# Patient Record
Sex: FEMALE
[De-identification: names, ages, dates, MRNs, and addresses within clinical notes are randomized per-mention and may not be internally consistent; named-entity substitution may affect disease eponyms.]

---

## 2017-04-03 ENCOUNTER — HOSPITAL ENCOUNTER (EMERGENCY)
Dept: HOSPITAL 5 - ED | Age: 27
Discharge: HOME | End: 2017-04-03
Payer: SELF-PAY

## 2017-04-03 VITALS — SYSTOLIC BLOOD PRESSURE: 114 MMHG | DIASTOLIC BLOOD PRESSURE: 61 MMHG

## 2017-04-03 DIAGNOSIS — N30.01: ICD-10-CM

## 2017-04-03 DIAGNOSIS — O23.11: Primary | ICD-10-CM

## 2017-04-03 DIAGNOSIS — Z3A.08: ICD-10-CM

## 2017-04-03 DIAGNOSIS — O99.331: ICD-10-CM

## 2017-04-03 LAB
BASOPHILS NFR BLD AUTO: 0.3 % (ref 0–1.8)
BILIRUB UR QL STRIP: (no result)
BLOOD UR QL VISUAL: (no result)
EOSINOPHIL NFR BLD AUTO: 1.3 % (ref 0–4.3)
HCT VFR BLD CALC: 32.8 % (ref 30.3–42.9)
HGB BLD-MCNC: 10.2 GM/DL (ref 10.1–14.3)
KETONES UR STRIP-MCNC: (no result) MG/DL
LEUKOCYTE ESTERASE UR QL STRIP: (no result)
MCH RBC QN AUTO: 23 PG (ref 28–32)
MCHC RBC AUTO-ENTMCNC: 31 % (ref 30–34)
MCV RBC AUTO: 73 FL (ref 79–97)
MUCOUS THREADS #/AREA URNS HPF: (no result) /HPF
NITRITE UR QL STRIP: (no result)
PH UR STRIP: 5 [PH] (ref 5–7)
PLATELET # BLD: 256 K/MM3 (ref 140–440)
RBC # BLD AUTO: 4.51 M/MM3 (ref 3.65–5.03)
RBC #/AREA URNS HPF: 4 /HPF (ref 0–6)
UROBILINOGEN UR-MCNC: < 2 MG/DL (ref ?–2)
WBC # BLD AUTO: 10.7 K/MM3 (ref 4.5–11)
WBC #/AREA URNS HPF: 118 /HPF (ref 0–6)

## 2017-04-03 PROCEDURE — 36415 COLL VENOUS BLD VENIPUNCTURE: CPT

## 2017-04-03 PROCEDURE — 86901 BLOOD TYPING SEROLOGIC RH(D): CPT

## 2017-04-03 PROCEDURE — 86900 BLOOD TYPING SEROLOGIC ABO: CPT

## 2017-04-03 PROCEDURE — 86850 RBC ANTIBODY SCREEN: CPT

## 2017-04-03 PROCEDURE — 76817 TRANSVAGINAL US OBSTETRIC: CPT

## 2017-04-03 PROCEDURE — 81001 URINALYSIS AUTO W/SCOPE: CPT

## 2017-04-03 PROCEDURE — 85025 COMPLETE CBC W/AUTO DIFF WBC: CPT

## 2017-04-03 PROCEDURE — 84702 CHORIONIC GONADOTROPIN TEST: CPT

## 2017-04-03 PROCEDURE — 76801 OB US < 14 WKS SINGLE FETUS: CPT

## 2017-04-03 NOTE — ULTRASOUND REPORT
Pelvic and transvaginal sonography:



History: Vaginal bleeding/abdominal pain.



Findings:



Uterus measures 12.7 x 5.3 x 7 cm. Single intrauterine gestation is 

noted. CRL of the fetus is 9.6 mm corresponding to 7 weeks of 

gestation. Fetal heart rate 133 per minute.



Right ovary 4.4 x 3.9 x 2.9 cm. No mass.



Left ovary 3.3 x 2.7 x 2.2 cm. Complex cyst in the left ovary measures 

2.3 cm.



Impression:



Single viable intrauterine gestation.

## 2017-04-03 NOTE — EMERGENCY DEPARTMENT REPORT
ED Female  HPI





- General


Chief complaint: Vaginal Bleeding


Stated complaint: VAGINAL BLEEDING/8WKS


Time Seen by Provider: 17 17:35


Source: patient


Mode of arrival: Ambulatory


Limitations: No Limitations





- History of Present Illness


Initial comments: 





PT states she is 8 weeks pregnant and she is concerned she is having a 

miscarriage.  PT is G4, P2.  PT denies other complicates with previous 

pregnancies.  PT states she started having some lower abd pressure last night.  

PT states last night she started cramping and spotting.  PT denies vaginal 

discharge.  PT states she only sees the blood after she wipes. PT states she is 

terminating this pregnancy and has an appointment next week. 


MD Complaint: vaginal bleeding


-: Gradual, Last night


Location: suprapubic


Severity scale (0 -10): 4


Quality: cramping, burning


Consistency: constant


Worsens with: urination


Are you Pregnant Now?: Yes


Associated Symptoms: vaginal bleeding, abdominal pain, dysuria, hematuria.  

denies: vaginal discharge, nausea/vomiting, fever/chills, loss of appetite





- Related Data


Sexually active: Yes


: 4


Para: 2


A: 1


 Home Medications











 Medication  Instructions  Recorded  Confirmed  Last Taken


 


Methadone HCl 120 mg PO QDAY 17 07:00








 Previous Rx's











 Medication  Instructions  Recorded  Last Taken  Type


 


Nitrofurantoin Mono/M-Cryst 100 mg PO Q12HR #14 capsule 17 Unknown Rx





[Macrobid CAP]    











 Allergies











Allergy/AdvReac Type Severity Reaction Status Date / Time


 


No Known Allergies Allergy   Verified 17 12:41














ED Review of Systems


ROS: 


Stated complaint: VAGINAL BLEEDING/8WKS


Other details as noted in HPI





Comment: All other systems reviewed and negative


Constitutional: denies: chills, fever


Gastrointestinal: abdominal pain.  denies: nausea, vomiting


Genitourinary: dysuria, hematuria, abnormal menses.  denies: discharge


Musculoskeletal: back pain (chronic - pt states she had normal MRI )





ED Past Medical Hx





- Past Medical History


Previous Medical History?: No





- Surgical History


Additional Surgical History: c-sectionx2.  





- Social History


Smoking Status: Current Every Day Smoker


Substance Use Type: Prescribed





- Medications


Home Medications: 


 Home Medications











 Medication  Instructions  Recorded  Confirmed  Last Taken  Type


 


Methadone HCl 120 mg PO QDAY 17 07:00 History


 


Nitrofurantoin Mono/M-Cryst 100 mg PO Q12HR #14 capsule 17  Unknown Rx





[Macrobid CAP]     














ED Physical Exam





- General


Limitations: No Limitations


General appearance: alert, in no apparent distress





- Head


Head exam: Present: atraumatic, normocephalic





- Eye


Eye exam: Present: normal appearance.  Absent: conjunctival injection





- ENT


ENT exam: Present: normal exam, normal external ear exam





- Neck


Neck exam: Present: normal inspection, full ROM





- Respiratory


Respiratory exam: Present: normal lung sounds bilaterally.  Absent: respiratory 

distress, wheezes, rales, rhonchi, stridor





- Cardiovascular


Cardiovascular Exam: Present: regular rate, normal rhythm, normal heart sounds





- GI/Abdominal


GI/Abdominal exam: Present: soft, normal bowel sounds.  Absent: distended, 

tenderness





- Extremities Exam


Extremities exam: Present: normal inspection, full ROM





- Back Exam


Back exam: Present: normal inspection, full ROM, other (tattoos).  Absent: 

tenderness, CVA tenderness (R), CVA tenderness (L), muscle spasm





- Neurological Exam


Neurological exam: Present: alert, oriented X3, normal gait





- Psychiatric


Psychiatric exam: Present: normal affect, normal mood





- Skin


Skin exam: Present: warm, dry, intact





ED Course


 Vital Signs











  17





  12:40


 


Temperature 98.2 F


 


Pulse Rate 76


 


Respiratory 16





Rate 


 


Blood Pressure 112/66


 


O2 Sat by Pulse 100





Oximetry 














- Reevaluation(s)


Reevaluation #1: 





17 17:52


PT aware of plan of care. 


Reevaluation #2: 





17 18:04


At this time pt is refusing pelvic exam and further testing. PT not concerned 

for STD.  PT aware without pelvic exam/ further labs she could have an 

undiagnosed medical condition.  PT aware she will need to follow up with OB/Gyn 








- Pulse Oximetry Interpretation


  ** Digit-Finger


Initial Pulse Oximetry Readin


Actions Taken: none





ED Medical Decision Making





- Lab Data


Result diagrams: 


 17 13:07








- Radiology Data


Radiology results: report reviewed





pelvic US- single live IUP seven weeks, hr 133





- Differential Diagnosis


uti, std, threatened ab 


Critical Care Time: No


Critical care attestation.: 


If time is entered above; I have spent that time in minutes in the direct care 

of this critically ill patient, excluding procedure time.








ED Disposition


Clinical Impression: 


 Abdominal pain affecting pregnancy





UTI (urinary tract infection)


Qualifiers:


 Urinary tract infection type: acute cystitis Hematuria presence: with 

hematuria Qualified Code(s): N30.01 - Acute cystitis with hematuria





Disposition: DISCHARGED TO HOME OR SELFCARE


Is pt being admited?: No


Does the pt Need Aspirin: No


Condition: Stable


Instructions:  Threatened Miscarriage (ED), Urinary Tract Infection in Women (ED

)


Additional Instructions: 


Pelvic rest x 7 days


OTC Tylenol as needed for cramping/ burning 


Finish all of your antibiotics, return to ED if fevers, chills, nausea or 

vomiting


Follow up with OB/GYN this week 


Prescriptions: 


Nitrofurantoin Mono/M-Cryst [Macrobid CAP] 100 mg PO Q12HR #14 capsule


Referrals: 


PRIMARY CARE,MD [Primary Care Provider] - 3-5 Days


YAMILETH HELM MD [Staff Physician] - 3-5 Days


Forms:  Work/School Release Form(ED)


Time of Disposition: 18:05

## 2018-01-07 ENCOUNTER — HOSPITAL ENCOUNTER (EMERGENCY)
Dept: HOSPITAL 5 - ED | Age: 28
Discharge: HOME | End: 2018-01-07
Payer: MEDICAID

## 2018-01-07 VITALS — SYSTOLIC BLOOD PRESSURE: 109 MMHG | DIASTOLIC BLOOD PRESSURE: 72 MMHG

## 2018-01-07 DIAGNOSIS — Z3A.00: ICD-10-CM

## 2018-01-07 DIAGNOSIS — O99.333: ICD-10-CM

## 2018-01-07 DIAGNOSIS — O23.43: Primary | ICD-10-CM

## 2018-01-07 LAB
BACTERIA #/AREA URNS HPF: (no result) /HPF
BENZODIAZEPINES SCREEN,URINE: (no result)
BILIRUB UR QL STRIP: (no result)
BLOOD UR QL VISUAL: (no result)
METHADONE SCREEN,URINE: (no result)
NITRITE UR QL STRIP: (no result)
OPIATE SCREEN,URINE: (no result)
PH UR STRIP: 8 [PH] (ref 5–7)
PROT UR STRIP-MCNC: (no result) MG/DL
RBC #/AREA URNS HPF: 4 /HPF (ref 0–6)
UROBILINOGEN UR-MCNC: < 2 MG/DL (ref ?–2)
WBC #/AREA URNS HPF: 2 /HPF (ref 0–6)

## 2018-01-07 PROCEDURE — 81025 URINE PREGNANCY TEST: CPT

## 2018-01-07 PROCEDURE — 81001 URINALYSIS AUTO W/SCOPE: CPT

## 2018-01-07 PROCEDURE — 80320 DRUG SCREEN QUANTALCOHOLS: CPT

## 2018-01-07 PROCEDURE — G0480 DRUG TEST DEF 1-7 CLASSES: HCPCS

## 2018-01-07 PROCEDURE — 87086 URINE CULTURE/COLONY COUNT: CPT

## 2018-01-07 PROCEDURE — 80307 DRUG TEST PRSMV CHEM ANLYZR: CPT

## 2018-01-07 PROCEDURE — 99283 EMERGENCY DEPT VISIT LOW MDM: CPT

## 2018-01-07 PROCEDURE — 36415 COLL VENOUS BLD VENIPUNCTURE: CPT

## 2018-01-07 NOTE — EMERGENCY DEPARTMENT REPORT
ED General Adult HPI





- General


Chief complaint: Pain General


Stated complaint: WITHDRAWAL FROM METH


Time Seen by Provider: 18 16:13


Source: patient


Mode of arrival: Ambulatory


Limitations: No Limitations





- History of Present Illness


Initial comments: 





Patient is a 27-year-old  female with a past medical history of 

chronic pain on methadone who is presenting with methadone withdrawal.  Patient 

states she last had her methadone yesterday was run out and cannot go to clinic 

until tomorrow.  Patient states she has chills with vomiting.  She states her 

only pain is suprapubic with some radiation to the lower back.  Patient denies 

any dysuria vaginal discharge or vaginal bleeding at this time.  Patient has 

has some mild nausea this been going on her entire pregnancy but is worse that 

she ran out of her methadone


Radiation: back


Severity scale (0 -10): 5


Quality: burning





- Related Data


 Home Medications











 Medication  Instructions  Recorded  Confirmed  Last Taken


 


Methadone HCl 120 mg PO QDAY 17 07:00








 Previous Rx's











 Medication  Instructions  Recorded  Last Taken  Type


 


Nitrofurantoin Mono/M-Cryst 100 mg PO Q12HR #14 capsule 17 Unknown Rx





[Macrobid CAP]    


 


Nitrofurantoin Monohyd/M-Cryst 100 mg PO BID #14 capsule 18 Unknown Rx





[Macrobid 100 mg Capsule]    


 


Ondansetron [Zofran Odt] 4 mg PO Q8HR #10 tab.rapdis 18 Unknown Rx











 Allergies











Allergy/AdvReac Type Severity Reaction Status Date / Time


 


No Known Allergies Allergy   Verified 17 12:41














ED Review of Systems


ROS: 


Stated complaint: WITHDRAWAL FROM METH


Other details as noted in HPI





Comment: All other systems reviewed and negative





ED Past Medical Hx





- Past Medical History


Previous Medical History?: No





- Surgical History


Past Surgical History?: Yes


Additional Surgical History: c-sectionx2.  





- Social History


Smoking Status: Current Every Day Smoker


Substance Use Type: None





- Medications


Home Medications: 


 Home Medications











 Medication  Instructions  Recorded  Confirmed  Last Taken  Type


 


Methadone HCl 120 mg PO QDAY 17 07:00 History


 


Nitrofurantoin Mono/M-Cryst 100 mg PO Q12HR #14 capsule 17  Unknown Rx





[Macrobid CAP]     


 


Nitrofurantoin Monohyd/M-Cryst 100 mg PO BID #14 capsule 18  Unknown Rx





[Macrobid 100 mg Capsule]     


 


Ondansetron [Zofran Odt] 4 mg PO Q8HR #10 tab.rapdis 18  Unknown Rx














ED Physical Exam





- General


Limitations: No Limitations


General appearance: alert, in no apparent distress





- Head


Head exam: Present: atraumatic, normocephalic





- Eye


Eye exam: Present: normal appearance





- ENT


ENT exam: Present: mucous membranes moist





- Neck


Neck exam: Present: normal inspection





- Respiratory


Respiratory exam: Present: normal lung sounds bilaterally.  Absent: respiratory 

distress





- Cardiovascular


Cardiovascular Exam: Present: regular rate, normal rhythm.  Absent: systolic 

murmur, diastolic murmur, rubs, gallop





- GI/Abdominal


GI/Abdominal exam: Present: soft, tenderness (suprapubic), normal bowel sounds





- Extremities Exam


Extremities exam: Present: normal inspection





- Back Exam


Back exam: Present: normal inspection





- Neurological Exam


Neurological exam: Present: alert, oriented X3





- Psychiatric


Psychiatric exam: Present: normal affect, normal mood





- Skin


Skin exam: Present: warm, dry, intact, normal color.  Absent: rash





ED Course





 Vital Signs











  18





  11:54 16:20 16:25


 


Temperature 98 F  98.1 F


 


Pulse Rate 103 H  80


 


Respiratory 16 13 12





Rate   


 


Blood Pressure 103/73  


 


Blood Pressure   109/72





[Left]   


 


O2 Sat by Pulse 98 100 





Oximetry   














ED Medical Decision Making





- Lab Data








 Lab Results











  18 Range/Units





  12:16 Unknown Unknown 


 


Urine Color   Yellow   (Yellow)  


 


Urine Turbidity   Clear   (Clear)  


 


Urine pH   8.0 H   (5.0-7.0)  


 


Ur Specific Gravity   1.009   (1.003-1.030)  


 


Urine Protein   <15 mg/dl   (Negative)  mg/dL


 


Urine Glucose (UA)   Neg   (Negative)  mg/dL


 


Urine Ketones   Neg   (Negative)  mg/dL


 


Urine Blood   Sm   (Negative)  


 


Urine Nitrite   Neg   (Negative)  


 


Urine Bilirubin   Neg   (Negative)  


 


Urine Urobilinogen   < 2.0   (<2.0)  mg/dL


 


Ur Leukocyte Esterase   Lg   (Negative)  


 


Urine WBC (Auto)   2.0   (0.0-6.0)  /HPF


 


Urine RBC (Auto)   4.0   (0.0-6.0)  /HPF


 


U Epithel Cells (Auto)   9.0   (0-13.0)  /HPF


 


Urine Bacteria (Auto)   1+   (Negative)  /HPF


 


Urine HCG, Qual   Positive A   (Negative)  


 


Urine Opiates Screen    Presumptive negative  


 


Urine Methadone Screen    Presumptive positive  


 


Ur Barbiturates Screen    Presumptive negative  


 


Ur Phencyclidine Scrn    Presumptive negative  


 


Ur Amphetamines Screen    Presumptive negative  


 


U Benzodiazepines Scrn    Presumptive positive  


 


Urine Cocaine Screen    Presumptive negative  


 


U Marijuana (THC) Screen    Presumptive negative  


 


Drugs of Abuse Note    Disclamer  


 


Plasma/Serum Alcohol  < 0.01    (0-0.07)  gm%














- Medical Decision Making





Patient given IV fluids and Zofran for nausea patient be discharged can follow 

with her methadone clinic tomorrow patient started on Macrobid for UTI


Critical care attestation.: 


If time is entered above; I have spent that time in minutes in the direct care 

of this critically ill patient, excluding procedure time.








ED Disposition


Clinical Impression: 


 Methadone dependence





UTI in pregnancy


Qualifiers:


 Trimester: third trimester Qualified Code(s): O23.43 - Unspecified infection 

of urinary tract in pregnancy, third trimester





Disposition: DC- TO HOME OR SELFCARE


Is pt being admited?: No


Does the pt Need Aspirin: No


Condition: Fair


Prescriptions: 


Nitrofurantoin Monohyd/M-Cryst [Macrobid 100 mg Capsule] 100 mg PO BID #14 

capsule


Ondansetron [Zofran Odt] 4 mg PO Q8HR #10 tab.minodis


Referrals: 


PRIMARY CARE,MD [Primary Care Provider] - 3-5 Days


Forms:  AMA Form

## 2019-10-21 ENCOUNTER — HOSPITAL ENCOUNTER (EMERGENCY)
Dept: HOSPITAL 5 - ED | Age: 29
Discharge: HOME | End: 2019-10-21
Payer: COMMERCIAL

## 2019-10-21 VITALS — SYSTOLIC BLOOD PRESSURE: 97 MMHG | DIASTOLIC BLOOD PRESSURE: 55 MMHG

## 2019-10-21 DIAGNOSIS — O23.42: Primary | ICD-10-CM

## 2019-10-21 DIAGNOSIS — Z3A.16: ICD-10-CM

## 2019-10-21 DIAGNOSIS — Z87.891: ICD-10-CM

## 2019-10-21 DIAGNOSIS — Z79.899: ICD-10-CM

## 2019-10-21 LAB
BACTERIA #/AREA URNS HPF: (no result) /HPF
BILIRUB UR QL STRIP: (no result)
BLOOD UR QL VISUAL: (no result)
MUCOUS THREADS #/AREA URNS HPF: (no result) /HPF
PH UR STRIP: 6 [PH] (ref 5–7)
PROT UR STRIP-MCNC: (no result) MG/DL
RBC #/AREA URNS HPF: 4 /HPF (ref 0–6)
UROBILINOGEN UR-MCNC: < 2 MG/DL (ref ?–2)
WBC #/AREA URNS HPF: 2 /HPF (ref 0–6)

## 2019-10-21 PROCEDURE — 76805 OB US >/= 14 WKS SNGL FETUS: CPT

## 2019-10-21 PROCEDURE — 81001 URINALYSIS AUTO W/SCOPE: CPT

## 2019-10-21 PROCEDURE — 81025 URINE PREGNANCY TEST: CPT

## 2019-10-21 NOTE — EMERGENCY DEPARTMENT REPORT
ED Female  HPI





- General


Chief complaint: Vaginal Bleeding


Stated complaint: 12WKS PREG/CRAMPS


Time Seen by Provider: 10/21/19 09:59


Source: patient


Mode of arrival: Ambulatory


Limitations: No Limitations





- History of Present Illness


Initial comments: 





This is a 29-year-old  female at approximately 16 weeks who presents to 

ED complaining of right-sided lower abdominal cramping type pain for the past 

days.  Patient denies vaginal bleeding, vaginal leaking or discharge of any 

kind.  Patient denies any radiation of pain elsewhere.  She denies 

fevers/chills/nausea or vomiting.  She states she has not received prenatal care

this pregnancy and is waiting to see Dr. Beal.





- Related Data


                                Home Medications











 Medication  Instructions  Recorded  Confirmed  Last Taken


 


Methadone HCl 120 mg PO QDAY 17 07:00








                                  Previous Rx's











 Medication  Instructions  Recorded  Last Taken  Type


 


Nitrofurantoin Monohyd/M-Cryst 100 mg PO BID #14 capsule 18 Unknown Rx





[Macrobid 100 mg Capsule]    


 


Ondansetron [Zofran Odt] 4 mg PO Q8HR #10 tab.rapdis 18 Unknown Rx


 


Acetaminophen [Acetaminophen 8 650 mg PO TID #30 tablet.er 10/21/19 Unknown Rx





Hour]    


 


Nitrofurantoin Mono/M-Cryst 100 mg PO Q12HR #14 capsule 10/21/19 Unknown Rx





[Macrobid CAP]    











                                    Allergies











Allergy/AdvReac Type Severity Reaction Status Date / Time


 


No Known Allergies Allergy   Verified 17 12:41














ED Review of Systems


ROS: 


Stated complaint: 12WKS PREG/CRAMPS


Other details as noted in HPI





Comment: All other systems reviewed and negative





ED Past Medical Hx





- Past Medical History


Previous Medical History?: No





- Surgical History


Past Surgical History?: Yes


Additional Surgical History: c-sectionx2.  





- Social History


Smoking Status: Former Smoker


Substance Use Type: None





- Medications


Home Medications: 


                                Home Medications











 Medication  Instructions  Recorded  Confirmed  Last Taken  Type


 


Methadone HCl 120 mg PO QDAY 17 07:00 History


 


Nitrofurantoin Monohyd/M-Cryst 100 mg PO BID #14 capsule 18  Unknown Rx





[Macrobid 100 mg Capsule]     


 


Ondansetron [Zofran Odt] 4 mg PO Q8HR #10 tab.rapdis 18  Unknown Rx


 


Acetaminophen [Acetaminophen 8 650 mg PO TID #30 tablet.er 10/21/19  Unknown Rx





Hour]     


 


Nitrofurantoin Mono/M-Cryst 100 mg PO Q12HR #14 capsule 10/21/19  Unknown Rx





[Macrobid CAP]     














ED Physical Exam





- General


Limitations: No Limitations


General appearance: alert, in no apparent distress





- Head


Head exam: Present: atraumatic, normocephalic





- Eye


Eye exam: Present: normal appearance





- ENT


ENT exam: Present: mucous membranes moist





- Neck


Neck exam: Present: normal inspection





- Respiratory


Respiratory exam: Present: normal lung sounds bilaterally.  Absent: respiratory 

distress





- Cardiovascular


Cardiovascular Exam: Present: regular rate, normal rhythm.  Absent: systolic 

murmur, diastolic murmur, rubs, gallop





- GI/Abdominal


GI/Abdominal exam: Present: soft, normal bowel sounds





- Extremities Exam


Extremities exam: Present: normal inspection





- Back Exam


Back exam: Present: normal inspection





- Neurological Exam


Neurological exam: Present: alert, oriented X3





- Psychiatric


Psychiatric exam: Present: normal affect, normal mood





- Skin


Skin exam: Present: warm, dry, intact, normal color.  Absent: rash





ED Course


                                   Vital Signs











  10/21/19 10/21/19





  09:42 12:22


 


Temperature 97.9 F 


 


Pulse Rate 85 


 


Respiratory 18 18





Rate  


 


Blood Pressure 97/55 


 


O2 Sat by Pulse 98 





Oximetry  














ED Medical Decision Making





- Radiology Data


Radiology results: report reviewed, image reviewed


LIMITED OBSTETRIC ULTRASOUND





HISTORY: Pelvic pain.





COMPARISON: None.





TECHNIQUE: Limited OB ultrasound performed.





FINDINGS:


Cervix: 4.5 cm in length and closed.


Ovaries And Uterus: Ovaries not seen. No adnexal mass or free fluid. No uterine 

abnormality.


Gestation: Flores Monochorionic monoamniotic intrauterine fetus.


Placenta: anterior location and free of the internal cervical os.


Fetal Presentation: Transverse with fetal head to maternal right.


Amniotic Fluid: Normal.





FETAL ANATOMY:


Detailed anatomic survey was not requested. Somatic activity is subjectively 

within normal limits.


Cardiac activity is regular rate at140 beats per minute.





FETAL BIOMETRY:


Biparietal diameter: 3.5 cm corresponding to16 weeks, 6 days


Head circumference: 13.5 cmcorresponding to17 weeks, 0 days


Abdominal circumference: 10.9 cmcorresponding to16 weeks, 5 days


Femur length: 2.1 cmcorresponding to16 weeks, 1 day.


Estimated fetal weight: 159 grams.





Estimated gestational age based on clinical history/previous ultrasound: 16 

weeks, 3 days with CT


4/3/2019


Estimated gestational age based on today's measurements: 16 weeks, 5 days with 

CT 2019.





IMPRESSION:





Single living intrauterine pregnancy at approximately 16 weeks, 5 days with no 

significant


abnormality. No explanation for pelvic pain.





Signer Name: Chelly Valenzuela MD


Signed: 10/21/2019 10:59 AM


Workstation Name: [id number]








Transcribed By: REF


Dictated By: CHELLY VALENZUELA MD


Electronically Authenticated By: CHELLY VALENZUELA MD


Signed Date/Time: 10/21/19 1059








- Medical Decision Making


29-year-old female presents with pelvic pain and 16 weeks pregnancy.


Patient has normal ultrasound, see report above.


Urinalysis shows her suggestive of UTI.  We'll treat with Macrobid.


Discussed the patient to use Tylenol for pain


Discussed the patient to follow-up with OB/GYN as referred.


Patient states she understands instructions and will follow-up. 


 Discussed the patient  increase his hydration throughout the day to avoid 

cramping.


Vital signs are normal patient is in no acute distress


Critical care attestation.: 


If time is entered above; I have spent that time in minutes in the direct care 

of this critically ill patient, excluding procedure time.








ED Disposition


Clinical Impression: 


 UTI (urinary tract infection), Acute cystitis during pregnancy





Disposition: DC-01 TO HOME OR SELFCARE


Is pt being admited?: No


Does the pt Need Aspirin: No


Condition: Stable


Instructions:  Urinary Tract Infection in Women (ED), Abdominal Pain in P

regnancy  (ED)


Additional Instructions: 


Make sure to follow up with the primary care physician as discussed.


Take all your medications as you've been prescribed.


If you have any worsening symptoms or develop new symptoms please return to ED 

immediately.


Prescriptions: 


Acetaminophen [Acetaminophen 8 Hour] 650 mg PO TID #30 tablet.er


Nitrofurantoin [place]/M-Cryst [Macrobid CAP] 100 mg PO Q12HR #14 capsule


Referrals: 


PRIMARY CARE,MD [Primary Care Provider] - 3-5 Days


[facility] [Provider Group] - 3-5 Days


[facility] [Provider Group] - 3-5 Days


Forms:  Accompanied Note, Work/School Release Form(ED)


Time of Disposition: 12:05

## 2019-10-21 NOTE — ULTRASOUND REPORT
LIMITED OBSTETRIC ULTRASOUND



HISTORY: Pelvic pain.



COMPARISON: None.



TECHNIQUE: Limited OB ultrasound performed.



FINDINGS:

Cervix: 4.5 cm in length and closed.

Ovaries And Uterus: Ovaries not seen. No adnexal mass or free fluid. No uterine abnormality.

Gestation: Flores Monochorionic monoamniotic intrauterine fetus.

Placenta: anterior location and free of the internal cervical os.

Fetal Presentation: Transverse with fetal head to maternal right.

Amniotic Fluid: Normal. 



FETAL ANATOMY: 

Detailed anatomic survey was not requested. Somatic activity is subjectively within normal limits.  C
ardiac activity is regular rate at140 beats per minute.



FETAL BIOMETRY:

Biparietal diameter: 3.5 cm corresponding to16 weeks, 6 days

Head circumference: 13.5 cmcorresponding to17 weeks, 0 days

Abdominal circumference: 10.9 cmcorresponding to16 weeks, 5 days

Femur length: 2.1 cmcorresponding to16 weeks, 1 day.

Estimated fetal weight: 159  grams.



Estimated gestational age based on clinical history/previous ultrasound: 16 weeks, 3 days with CT 4/
3/2019

Estimated gestational age based on today's measurements: 16 weeks, 5 days with CT 4/1/2019.



IMPRESSION:



Single living intrauterine pregnancy at approximately 16 weeks, 5 days with no significant abnormalit
y. No explanation for pelvic pain.



Signer Name: Amish Lewis MD 

Signed: 10/21/2019 10:59 AM

 Workstation Name: [id number]

## 2020-02-15 ENCOUNTER — HOSPITAL ENCOUNTER (OUTPATIENT)
Dept: HOSPITAL 5 - TRG | Age: 30
Discharge: HOME | End: 2020-02-15
Attending: OBSTETRICS & GYNECOLOGY
Payer: COMMERCIAL

## 2020-02-15 DIAGNOSIS — O99.013: ICD-10-CM

## 2020-02-15 DIAGNOSIS — R10.9: ICD-10-CM

## 2020-02-15 DIAGNOSIS — O47.03: ICD-10-CM

## 2020-02-15 DIAGNOSIS — D64.9: ICD-10-CM

## 2020-02-15 DIAGNOSIS — O26.893: Primary | ICD-10-CM

## 2020-02-15 DIAGNOSIS — Z3A.33: ICD-10-CM

## 2020-02-15 LAB
BILIRUB UR QL STRIP: (no result)
BLOOD UR QL VISUAL: (no result)
MUCOUS THREADS #/AREA URNS HPF: (no result) /HPF
PH UR STRIP: 5 [PH] (ref 5–7)
RBC #/AREA URNS HPF: 3 /HPF (ref 0–6)
UROBILINOGEN UR-MCNC: < 2 MG/DL (ref ?–2)
WBC #/AREA URNS HPF: 1 /HPF (ref 0–6)

## 2020-02-15 PROCEDURE — 96361 HYDRATE IV INFUSION ADD-ON: CPT

## 2020-02-15 PROCEDURE — 96374 THER/PROPH/DIAG INJ IV PUSH: CPT

## 2020-02-15 PROCEDURE — 96360 HYDRATION IV INFUSION INIT: CPT

## 2020-02-15 PROCEDURE — 59025 FETAL NON-STRESS TEST: CPT

## 2020-02-15 PROCEDURE — 81001 URINALYSIS AUTO W/SCOPE: CPT

## 2020-02-15 PROCEDURE — 96365 THER/PROPH/DIAG IV INF INIT: CPT

## 2021-12-29 ENCOUNTER — HOSPITAL ENCOUNTER (EMERGENCY)
Dept: HOSPITAL 5 - ED | Age: 31
Discharge: LEFT BEFORE DISCHARGE | End: 2021-12-29
Payer: COMMERCIAL

## 2021-12-29 VITALS — DIASTOLIC BLOOD PRESSURE: 83 MMHG | SYSTOLIC BLOOD PRESSURE: 124 MMHG

## 2021-12-29 DIAGNOSIS — R22.43: Primary | ICD-10-CM

## 2021-12-29 DIAGNOSIS — Z53.21: ICD-10-CM
